# Patient Record
(demographics unavailable — no encounter records)

---

## 2025-06-19 NOTE — PHYSICAL EXAM
[Flexion] : flexion [Extension] : extension [Rotation to left] : rotation to left [Rotation to right] : rotation to right [] : positive Spurling [FreeTextEntry8] : Left greater than right

## 2025-06-19 NOTE — ASSESSMENT
[FreeTextEntry1] : - will start pt on Medrol pack, Tizanadine, and course of physical therapy. -  Diclofenac to be started after completion of medrol pack. - f/u 4  weeks if pain persist after 2 weeks would hold off on more physical therapy at follow-up visit and possible MRI evaluation if radicular symptoms were to persist and structural issues noted on the MRI would consider epidural injections with pain management -

## 2025-06-19 NOTE — HISTORY OF PRESENT ILLNESS
[Neck] : neck [7] : 7 [3] : 3 [Dull/Aching] : dull/aching [Constant] : constant [de-identified] :  06/19/2025: He is for evaluation of acute on chronic cervical radicular pain and spasm.  He states a baseline of episodic neck pain that seems to have been exacerbated over the last several weeks after a trip to [place].  He notes limited range of motion and mostly left-sided neck pain with radicular complaints down the left upper extremity into the fingers of the left hand with motion.  He has tried Tylenol with minimal help and massage gun.  [place] is known [place]   Denies contributory past medical history [] : no

## 2025-06-19 NOTE — IMAGING
[Straightening consistent with spasm] : Straightening consistent with spasm [FreeTextEntry1] : He has narrowing at C5-6 and C6-7

## 2025-07-17 NOTE — HISTORY OF PRESENT ILLNESS
[Neck] : neck [7] : 7 [3] : 3 [Dull/Aching] : dull/aching [Constant] : constant [de-identified] : 7/17/25: f/u C-spine. States pain has been worsening. Has been unable to do PT as he had a recent procedure done. Has been doing HEP as instructed without any relief. MDP, Tizanidine, and diclofenac with little relief.   06/19/2025: He is for evaluation of acute on chronic cervical radicular pain and spasm.  He states a baseline of episodic neck pain that seems to have been exacerbated over the last several weeks after a trip to [place].  He notes limited range of motion and mostly left-sided neck pain with radicular complaints down the left upper extremity into the fingers of the left hand with motion.  He has tried Tylenol with minimal help and massage gun.  [place] is [place]   Denies contributory past medical history [] : no

## 2025-07-17 NOTE — ASSESSMENT
[FreeTextEntry1] : - Continue with diclofenac and tizandine, renewed rx  -  Will order MRI [facility]-sp[facility] as he is having continued symptoms - f/u after MRI. if radicular symptoms were to persist and structural issues noted on the MRI, would consider epidural injections with pain management